# Patient Record
Sex: FEMALE | Race: WHITE | URBAN - METROPOLITAN AREA
[De-identification: names, ages, dates, MRNs, and addresses within clinical notes are randomized per-mention and may not be internally consistent; named-entity substitution may affect disease eponyms.]

---

## 2021-01-06 ENCOUNTER — WEB ENCOUNTER (OUTPATIENT)
Dept: URBAN - METROPOLITAN AREA CLINIC 12 | Facility: CLINIC | Age: 71
End: 2021-01-06

## 2021-02-12 ENCOUNTER — OFFICE VISIT (OUTPATIENT)
Dept: URBAN - METROPOLITAN AREA CLINIC 12 | Facility: CLINIC | Age: 71
End: 2021-02-12

## 2021-04-05 ENCOUNTER — OFFICE VISIT (OUTPATIENT)
Dept: URBAN - METROPOLITAN AREA CLINIC 12 | Facility: CLINIC | Age: 71
End: 2021-04-05

## 2021-04-27 ENCOUNTER — OFFICE VISIT (OUTPATIENT)
Dept: URBAN - METROPOLITAN AREA CLINIC 12 | Facility: CLINIC | Age: 71
End: 2021-04-27

## 2021-06-15 ENCOUNTER — OFFICE VISIT (OUTPATIENT)
Dept: URBAN - METROPOLITAN AREA CLINIC 12 | Facility: CLINIC | Age: 71
End: 2021-06-15

## 2021-07-08 ENCOUNTER — OFFICE VISIT (OUTPATIENT)
Dept: URBAN - METROPOLITAN AREA CLINIC 12 | Facility: CLINIC | Age: 71
End: 2021-07-08

## 2021-08-24 ENCOUNTER — OFFICE VISIT (OUTPATIENT)
Dept: URBAN - METROPOLITAN AREA CLINIC 12 | Facility: CLINIC | Age: 71
End: 2021-08-24

## 2021-10-04 ENCOUNTER — OFFICE VISIT (OUTPATIENT)
Dept: URBAN - METROPOLITAN AREA CLINIC 12 | Facility: CLINIC | Age: 71
End: 2021-10-04

## 2021-11-01 ENCOUNTER — OFFICE VISIT (OUTPATIENT)
Dept: URBAN - METROPOLITAN AREA CLINIC 12 | Facility: CLINIC | Age: 71
End: 2021-11-01
Payer: MEDICARE

## 2021-11-01 DIAGNOSIS — R12 HEARTBURN: ICD-10-CM

## 2021-11-01 DIAGNOSIS — K57.30 DIVERTICULA OF COLON: ICD-10-CM

## 2021-11-01 DIAGNOSIS — K44.9 HIATAL HERNIA: ICD-10-CM

## 2021-11-01 DIAGNOSIS — K21.00 GASTROESOPHAGEAL REFLUX DISEASE WITH ESOPHAGITIS WITHOUT HEMORRHAGE: ICD-10-CM

## 2021-11-01 DIAGNOSIS — Z87.898 HISTORY OF PALPITATIONS: ICD-10-CM

## 2021-11-01 DIAGNOSIS — Z86.010 HISTORY OF ADENOMATOUS POLYP OF COLON: ICD-10-CM

## 2021-11-01 PROBLEM — 266433003: Status: ACTIVE | Noted: 2021-11-01

## 2021-11-01 PROBLEM — 473122004: Status: ACTIVE | Noted: 2021-11-01

## 2021-11-01 PROBLEM — 733657002: Status: ACTIVE | Noted: 2021-11-01

## 2021-11-01 PROBLEM — 84089009: Status: ACTIVE | Noted: 2021-11-01

## 2021-11-01 PROCEDURE — 99214 OFFICE O/P EST MOD 30 MIN: CPT | Performed by: INTERNAL MEDICINE

## 2021-11-01 RX ORDER — POLYETHYLENE GLYCOL 3350, SODIUM SULFATE, SODIUM CHLORIDE, POTASSIUM CHLORIDE, ASCORBIC ACID, SODIUM ASCORBATE 140-9-5.2G
AS DIRECTED KIT ORAL
Qty: 1 BOX | Refills: 0 | OUTPATIENT
Start: 2021-11-01 | End: 2021-11-03

## 2021-11-01 RX ORDER — CETIRIZINE HYDROCHLORIDE 10 MG/1
TABLET, FILM COATED ORAL
Qty: 0 | Refills: 0 | Status: DISCONTINUED | COMMUNITY
Start: 1900-01-01

## 2021-11-01 RX ORDER — ESOMEPRAZOLE MAGNESIUM 40 MG/1
1 CAPSULE CAPSULE, DELAYED RELEASE ORAL ONCE A DAY
Status: ACTIVE | COMMUNITY

## 2021-11-01 NOTE — HPI-TODAY'S VISIT:
This is a 71-year-old woman presents for evaluation. She said that she would like to have an EGD and colonoscopy performed.   Patient did have palpitaitons without chest pain or shortness of breath earlier this summer. She states that she had negative evaluation at [facility] except for SIADH and sees a nephrologist and is on surveillance.  She then did see her cardiologist Dr. Rocael Pandya and recently had an event recorder and believes that it looked fine.    Patient has a long history of reflux and indigestion and has been taking esomeprazole 40mg daily.   Patient states that she went to another GI practice in [facility] but she cannot recall the name and states that she had insurance issues with taking esomeprazole 40mg as prescribed there. Prior to this she was taking Zegerid over-the-counter which seemed to help her symptoms.   There has been no dysphagia or odynophagia or early satiety or melena recently. She had about 2 episodes of dysphagia for 4-5 years ago but has not had any problems with dysphagia since then. No abdominal pain. There's been no weight loss. She has had episodes of changes in bowel habits but currently her bowel habits are regular. She does have a history of diverticulosis in the leftm colon  and had an adenomatous polyp removed from the ascending colon in October of 2018 and was noted to have internal hemorrhoids.   She had an EGD which showed reflux esophagitis and hiatal hernia in the past as well. She avoids spicy foods, onions and garlic to help her heartburn symptoms.   No abdominal pain. There has been no rectal bleeding or melena. There has been no fever chills or sweats. There's been no cough or shortness of breath. She does see her primary care for routine healthcare maintenance.   She has had both COVID-19 vaccinations and is planning to get the booster as well.

## 2021-12-10 PROBLEM — 429047008: Status: ACTIVE | Noted: 2021-11-01

## 2022-01-10 ENCOUNTER — OFFICE VISIT (OUTPATIENT)
Dept: URBAN - METROPOLITAN AREA LAB 3 | Facility: LAB | Age: 72
End: 2022-01-10

## 2022-03-28 ENCOUNTER — TELEPHONE ENCOUNTER (OUTPATIENT)
Dept: URBAN - METROPOLITAN AREA CLINIC 12 | Facility: CLINIC | Age: 72
End: 2022-03-28

## 2022-04-11 ENCOUNTER — OFFICE VISIT (OUTPATIENT)
Dept: URBAN - METROPOLITAN AREA LAB 3 | Facility: LAB | Age: 72
End: 2022-04-11

## 2022-07-12 ENCOUNTER — OFFICE VISIT (OUTPATIENT)
Dept: URBAN - METROPOLITAN AREA CLINIC 12 | Facility: CLINIC | Age: 72
End: 2022-07-12

## 2023-07-03 ENCOUNTER — OFFICE VISIT (OUTPATIENT)
Dept: URBAN - METROPOLITAN AREA CLINIC 12 | Facility: CLINIC | Age: 73
End: 2023-07-03
Payer: MEDICARE

## 2023-07-03 VITALS
TEMPERATURE: 96.9 F | DIASTOLIC BLOOD PRESSURE: 76 MMHG | HEIGHT: 65 IN | SYSTOLIC BLOOD PRESSURE: 152 MMHG | HEART RATE: 60 BPM | WEIGHT: 126 LBS | BODY MASS INDEX: 20.99 KG/M2

## 2023-07-03 DIAGNOSIS — K57.30 DIVERTICULA OF COLON: ICD-10-CM

## 2023-07-03 DIAGNOSIS — Z86.010 HISTORY OF ADENOMATOUS POLYP OF COLON: ICD-10-CM

## 2023-07-03 DIAGNOSIS — K21.00 GASTROESOPHAGEAL REFLUX DISEASE WITH ESOPHAGITIS WITHOUT HEMORRHAGE: ICD-10-CM

## 2023-07-03 DIAGNOSIS — K44.9 HIATAL HERNIA: ICD-10-CM

## 2023-07-03 DIAGNOSIS — R12 HEARTBURN: ICD-10-CM

## 2023-07-03 DIAGNOSIS — K22.70 BARRETT'S ESOPHAGUS DETERMINED BY BIOPSY: ICD-10-CM

## 2023-07-03 PROBLEM — 16331000: Status: ACTIVE | Noted: 2023-07-03

## 2023-07-03 PROBLEM — 302914006: Status: ACTIVE | Noted: 2023-07-03

## 2023-07-03 PROCEDURE — 99214 OFFICE O/P EST MOD 30 MIN: CPT | Performed by: INTERNAL MEDICINE

## 2023-07-03 RX ORDER — ESOMEPRAZOLE MAGNESIUM 40 MG/1
1 CAPSULE CAPSULE, DELAYED RELEASE ORAL ONCE A DAY
Status: ACTIVE | COMMUNITY

## 2023-07-03 RX ORDER — POLYETHYLENE GLYCOL 3350, SODIUM SULFATE, SODIUM CHLORIDE, POTASSIUM CHLORIDE, ASCORBIC ACID, SODIUM ASCORBATE 140-9-5.2G
AS DIRECTED KIT ORAL
Qty: 1 BOX | Refills: 0 | OUTPATIENT
Start: 2023-07-03 | End: 2023-07-05

## 2023-07-03 NOTE — HPI-TODAY'S VISIT:
72F last seen in office on 11/1/21.  She has heartburn and indigestion and has been on esomeprazole 40mg Qday. She avoids foods and drinks that increase acid production. No dysophagia. No abdominal pain. No weight loss.  She has BMs on most days but has rare constipation which resolves with eating extra Raisin Bran cereal. No rectal bleeding or melena. No bloating.  She sees PCP regularly. No anemia.  EGD on 4/19/19 showed small HH and reflux esophagitis with J shaped stomach.  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   Colonosocpy on 10/3/18 with TA removed from asc colon and also left tics, IH with good prep.  Patient is seeing her cardiologist Dr. Gretta Pandya on 7/7/23 and will be having nuclear stress testing since she has had some fluctuations in BP and HR. States that she had Holter monitor and was noted to have PVCs but no other worrisome issues.  No chest pain or shortness of breath.   States that she had postop spinal surgery DVT which resolved after DOAC and then was stopped.

## 2023-08-22 ENCOUNTER — TELEPHONE ENCOUNTER (OUTPATIENT)
Dept: URBAN - METROPOLITAN AREA CLINIC 12 | Facility: CLINIC | Age: 73
End: 2023-08-22

## 2023-08-23 ENCOUNTER — OFFICE VISIT (OUTPATIENT)
Dept: URBAN - METROPOLITAN AREA MEDICAL CENTER 27 | Facility: MEDICAL CENTER | Age: 73
End: 2023-08-23
Payer: MEDICARE

## 2023-08-23 DIAGNOSIS — K22.89 OTHER SPECIFIED DISEASE OF ESOPHAGUS: ICD-10-CM

## 2023-08-23 DIAGNOSIS — Z86.010 ADENOMAS PERSONAL HISTORY OF COLONIC POLYPS: ICD-10-CM

## 2023-08-23 DIAGNOSIS — R12 BURNING REFLUX: ICD-10-CM

## 2023-08-23 DIAGNOSIS — K63.89 OTHER SPECIFIED DISEASES OF INTESTINE: ICD-10-CM

## 2023-08-23 PROCEDURE — 45385 COLONOSCOPY W/LESION REMOVAL: CPT | Performed by: INTERNAL MEDICINE

## 2023-08-23 PROCEDURE — 43239 EGD BIOPSY SINGLE/MULTIPLE: CPT | Performed by: INTERNAL MEDICINE

## 2023-08-30 ENCOUNTER — DASHBOARD ENCOUNTERS (OUTPATIENT)
Age: 73
End: 2023-08-30

## 2023-08-30 ENCOUNTER — OFFICE VISIT (OUTPATIENT)
Dept: URBAN - METROPOLITAN AREA CLINIC 111 | Facility: CLINIC | Age: 73
End: 2023-08-30
Payer: MEDICARE

## 2023-08-30 VITALS
HEIGHT: 65 IN | SYSTOLIC BLOOD PRESSURE: 161 MMHG | TEMPERATURE: 97.7 F | HEART RATE: 57 BPM | DIASTOLIC BLOOD PRESSURE: 71 MMHG | BODY MASS INDEX: 20.99 KG/M2 | WEIGHT: 126 LBS

## 2023-08-30 DIAGNOSIS — K57.90 DIVERTICULOSIS: ICD-10-CM

## 2023-08-30 DIAGNOSIS — K44.9 HIATAL HERNIA: ICD-10-CM

## 2023-08-30 PROCEDURE — 99213 OFFICE O/P EST LOW 20 MIN: CPT | Performed by: NURSE PRACTITIONER

## 2023-08-30 RX ORDER — ESOMEPRAZOLE MAGNESIUM 40 MG/1
1 CAPSULE CAPSULE, DELAYED RELEASE ORAL ONCE A DAY
Status: ACTIVE | COMMUNITY

## 2023-09-08 PROBLEM — 397881000: Status: ACTIVE | Noted: 2023-09-08
